# Patient Record
Sex: FEMALE | Race: WHITE | NOT HISPANIC OR LATINO
[De-identification: names, ages, dates, MRNs, and addresses within clinical notes are randomized per-mention and may not be internally consistent; named-entity substitution may affect disease eponyms.]

---

## 2017-03-09 ENCOUNTER — RX ONLY (OUTPATIENT)
Age: 42
Setting detail: RX ONLY
End: 2017-03-09

## 2018-01-11 ENCOUNTER — HOSPITAL ENCOUNTER (OUTPATIENT)
Dept: RADIOLOGY | Facility: MEDICAL CENTER | Age: 43
End: 2018-01-11
Attending: OBSTETRICS & GYNECOLOGY
Payer: COMMERCIAL

## 2018-01-11 DIAGNOSIS — Z12.31 SCREENING MAMMOGRAM, ENCOUNTER FOR: ICD-10-CM

## 2018-01-11 PROCEDURE — 77067 SCR MAMMO BI INCL CAD: CPT

## 2018-01-12 ENCOUNTER — HOSPITAL ENCOUNTER (OUTPATIENT)
Dept: LAB | Facility: MEDICAL CENTER | Age: 43
End: 2018-01-12
Attending: FAMILY MEDICINE
Payer: COMMERCIAL

## 2018-01-12 ENCOUNTER — OFFICE VISIT (OUTPATIENT)
Dept: MEDICAL GROUP | Age: 43
End: 2018-01-12
Payer: COMMERCIAL

## 2018-01-12 VITALS
SYSTOLIC BLOOD PRESSURE: 102 MMHG | HEIGHT: 64 IN | DIASTOLIC BLOOD PRESSURE: 60 MMHG | HEART RATE: 85 BPM | OXYGEN SATURATION: 98 % | BODY MASS INDEX: 20.76 KG/M2 | TEMPERATURE: 98.2 F | WEIGHT: 121.6 LBS

## 2018-01-12 DIAGNOSIS — L65.9 HAIR LOSS: ICD-10-CM

## 2018-01-12 DIAGNOSIS — E78.00 PURE HYPERCHOLESTEROLEMIA: ICD-10-CM

## 2018-01-12 DIAGNOSIS — Z00.00 PREVENTATIVE HEALTH CARE: ICD-10-CM

## 2018-01-12 DIAGNOSIS — K42.0 UMBILICAL HERNIA WITH OBSTRUCTION, WITHOUT GANGRENE: ICD-10-CM

## 2018-01-12 DIAGNOSIS — Z23 NEED FOR VACCINATION: ICD-10-CM

## 2018-01-12 PROBLEM — K42.9 UMBILICAL HERNIA: Status: ACTIVE | Noted: 2018-01-12

## 2018-01-12 LAB
ALBUMIN SERPL BCP-MCNC: 4.4 G/DL (ref 3.2–4.9)
ALBUMIN/GLOB SERPL: 1.4 G/DL
ALP SERPL-CCNC: 34 U/L (ref 30–99)
ALT SERPL-CCNC: 22 U/L (ref 2–50)
ANION GAP SERPL CALC-SCNC: 4 MMOL/L (ref 0–11.9)
AST SERPL-CCNC: 18 U/L (ref 12–45)
BASOPHILS # BLD AUTO: 0.8 % (ref 0–1.8)
BASOPHILS # BLD: 0.04 K/UL (ref 0–0.12)
BILIRUB SERPL-MCNC: 0.8 MG/DL (ref 0.1–1.5)
BUN SERPL-MCNC: 10 MG/DL (ref 8–22)
CALCIUM SERPL-MCNC: 9.7 MG/DL (ref 8.5–10.5)
CHLORIDE SERPL-SCNC: 106 MMOL/L (ref 96–112)
CHOLEST SERPL-MCNC: 211 MG/DL (ref 100–199)
CO2 SERPL-SCNC: 30 MMOL/L (ref 20–33)
CREAT SERPL-MCNC: 0.73 MG/DL (ref 0.5–1.4)
EOSINOPHIL # BLD AUTO: 0.13 K/UL (ref 0–0.51)
EOSINOPHIL NFR BLD: 2.7 % (ref 0–6.9)
ERYTHROCYTE [DISTWIDTH] IN BLOOD BY AUTOMATED COUNT: 46.3 FL (ref 35.9–50)
GLOBULIN SER CALC-MCNC: 3.2 G/DL (ref 1.9–3.5)
GLUCOSE SERPL-MCNC: 80 MG/DL (ref 65–99)
HCT VFR BLD AUTO: 44.2 % (ref 37–47)
HDLC SERPL-MCNC: 74 MG/DL
HGB BLD-MCNC: 14.4 G/DL (ref 12–16)
IMM GRANULOCYTES # BLD AUTO: 0.01 K/UL (ref 0–0.11)
IMM GRANULOCYTES NFR BLD AUTO: 0.2 % (ref 0–0.9)
LDLC SERPL CALC-MCNC: 122 MG/DL
LYMPHOCYTES # BLD AUTO: 1.27 K/UL (ref 1–4.8)
LYMPHOCYTES NFR BLD: 26.4 % (ref 22–41)
MCH RBC QN AUTO: 31.4 PG (ref 27–33)
MCHC RBC AUTO-ENTMCNC: 32.6 G/DL (ref 33.6–35)
MCV RBC AUTO: 96.5 FL (ref 81.4–97.8)
MONOCYTES # BLD AUTO: 0.35 K/UL (ref 0–0.85)
MONOCYTES NFR BLD AUTO: 7.3 % (ref 0–13.4)
NEUTROPHILS # BLD AUTO: 3.01 K/UL (ref 2–7.15)
NEUTROPHILS NFR BLD: 62.6 % (ref 44–72)
NRBC # BLD AUTO: 0 K/UL
NRBC BLD-RTO: 0 /100 WBC
PLATELET # BLD AUTO: 251 K/UL (ref 164–446)
PMV BLD AUTO: 11.3 FL (ref 9–12.9)
POTASSIUM SERPL-SCNC: 3.5 MMOL/L (ref 3.6–5.5)
PROT SERPL-MCNC: 7.6 G/DL (ref 6–8.2)
RBC # BLD AUTO: 4.58 M/UL (ref 4.2–5.4)
SODIUM SERPL-SCNC: 140 MMOL/L (ref 135–145)
TRIGL SERPL-MCNC: 75 MG/DL (ref 0–149)
TSH SERPL DL<=0.005 MIU/L-ACNC: 1.11 UIU/ML (ref 0.38–5.33)
WBC # BLD AUTO: 4.8 K/UL (ref 4.8–10.8)

## 2018-01-12 PROCEDURE — 80053 COMPREHEN METABOLIC PANEL: CPT

## 2018-01-12 PROCEDURE — 80061 LIPID PANEL: CPT

## 2018-01-12 PROCEDURE — 90471 IMMUNIZATION ADMIN: CPT | Performed by: FAMILY MEDICINE

## 2018-01-12 PROCEDURE — 99203 OFFICE O/P NEW LOW 30 MIN: CPT | Mod: 25 | Performed by: FAMILY MEDICINE

## 2018-01-12 PROCEDURE — 90715 TDAP VACCINE 7 YRS/> IM: CPT | Performed by: FAMILY MEDICINE

## 2018-01-12 PROCEDURE — 36415 COLL VENOUS BLD VENIPUNCTURE: CPT

## 2018-01-12 PROCEDURE — 90472 IMMUNIZATION ADMIN EACH ADD: CPT | Performed by: FAMILY MEDICINE

## 2018-01-12 PROCEDURE — 84443 ASSAY THYROID STIM HORMONE: CPT

## 2018-01-12 PROCEDURE — 90686 IIV4 VACC NO PRSV 0.5 ML IM: CPT | Performed by: FAMILY MEDICINE

## 2018-01-12 PROCEDURE — 85025 COMPLETE CBC W/AUTO DIFF WBC: CPT

## 2018-01-12 NOTE — ASSESSMENT & PLAN NOTE
The patient states that she is concerned about having hypothyroidism because she's beginning to lose hair daily in the shower. She can see chunks of hair and after the shower. denies any new fatigue, cold/heat intolerance, weight gain/weight loss, diarrhea/constipation, dry skin, myalgia, depressed mood, palpitations, tremmor, and no goiter.

## 2018-01-12 NOTE — ASSESSMENT & PLAN NOTE
The patient is a 42-year-old  female who presents to clinic to establish care, she has a significant past medical history of umbilical hernia recent history of hair loss she's concerned about having hypothyroidism.   The patient denied any chest pain, no sob, no anand, no  pnd, no orthopnea, no headache, no changes in vision, no numbness or tingling, no nausea, no diarrhea, no abdominal pain, no fevers, no chills, no bright red blood per rectum, no  difficulty urinating, no burning during micturition, no depressed mood, no other concerns.      Has an IUD     No CAD    Father had lymphoma at age 70  at 72    Goes to gym about 3 times per wk, cardio and weights  Dr. Mcpherson has done pap one month     [id number]--00 and 15

## 2018-01-12 NOTE — ASSESSMENT & PLAN NOTE
The patient states that she developed an umbilical hernia after her delivery of her second child, she had this repaired about 5 years ago. Everything else seems to be fine and she continues to do abdominal crunches without any issues. She denies any abdominal pain or change in bowel or urination.

## 2018-01-12 NOTE — PROGRESS NOTES
This medical record contains text that has been entered with the assistance of computer voice recognition and dictation software.  Therefore, it may contain unintended errors in text, spelling, punctuation, or grammar    Chief Complaint   Patient presents with   • Establish Care       Jennifer Leon is a 42 y.o. female here evaluation and management of: Umbilical hernia, establish care, hair loss      HPI:     Preventative health care  The patient is a 42-year-old  female who presents to clinic to [facility], she has a significant past medical history of umbilical hernia recent history of hair loss she's concerned about having hypothyroidism.   The patient denied any chest pain, no sob, no anand, no  pnd, no orthopnea, no headache, no changes in vision, no numbness or tingling, no nausea, no diarrhea, no abdominal pain, no fevers, no chills, no bright red blood per rectum, no  difficulty urinating, no burning during micturition, no depressed mood, no other concerns.      Has an IUD     No CAD    Father had lymphoma at age 70  at 72    Goes to gym about 3 times per wk, cardio and weights  Dr. Mcpherson has done pap one month     C4X3--01 and 15      Umbilical hernia  The patient states that she developed an umbilical hernia after her delivery of her second child, she had this repaired about 5 years ago. Everything else seems to be fine and she continues to do abdominal crunches without any issues. She denies any abdominal pain or change in bowel or urination.    Hair loss  The patient states that she is concerned about having hypothyroidism because she's beginning to lose hair daily in the shower. She can see chunks of hair and after the shower. denies any new fatigue, cold/heat intolerance, weight gain/weight loss, diarrhea/constipation, dry skin, myalgia, depressed mood, palpitations, tremmor, and no goiter.      Current medicines (including changes today)  Current Outpatient Prescriptions  "  Medication Sig Dispense Refill   • Pseudoephedrine-APAP-DM (DAYQUIL PO) Take  by mouth.     • IUD'S IU by Intrauterine route.       No current facility-administered medications for this visit.      She  has a past medical history of Anesthesia; Indigestion; Unspecified urinary incontinence; and Urinary bladder disorder.  She  has a past surgical history that includes other; pr enlarge breast (2004); ventral hernia repair laparoscopic (3/14/2012); and pr enlarge breast with implant.  Social History   Substance Use Topics   • Smoking status: Never Smoker   • Smokeless tobacco: Never Used   • Alcohol use Yes      Comment: 1 per week     Social History     Social History Narrative   • No narrative on file     History reviewed. No pertinent family history.  No family status information on file.         ROS    Please see hpi     All other systems reviewed and are negative     Objective:     Blood pressure 102/60, pulse 85, temperature 36.8 °C (98.2 °F), height 1.626 m (5' 4.02\"), weight 55.2 kg (121 lb 9.6 oz), SpO2 98 %. Body mass index is 20.86 kg/m².  Physical Exam:    Constitutional: Alert, no distress.  Skin: Warm, dry, good turgor, no rashes in visible areas.  Eye: Equal, round and reactive, conjunctiva clear, lids normal.  ENMT: Lips without lesions, good dentition, oropharynx clear.  Neck: Trachea midline, no masses, no thyromegaly. No cervical or supraclavicular lymphadenopathy.  Respiratory: Unlabored respiratory effort, lungs clear to auscultation, no wheezes, no ronchi.  Cardiovascular: Normal S1, S2, no murmur, no edema.  Abdomen: Soft, non-tender, no masses, no hepatosplenomegaly.  Psych: Alert and oriented x3, normal affect and mood.          Assessment and Plan:   The following treatment plan was discussed      1. Hair loss  We will obtain TSH  Also recommend using minoxidil topical 5%  bid  - TSH WITH REFLEX TO FT4; Future    2. Preventative health care  Care has been established  We need baseline labs " to establish a clinical profileWe reviewed USPSTF guidelines    This patient is due for mammogram  States that she had a normal pap last month  Requested Medical records to be sent to us  Denies intimate partner viloence    Due for TDAP and flu        3. Need for vaccination  Given today    - INFLUENZA VACCINE QUAD INJ >3Y(PF)  - TDAP VACCINE =>8YO IM    4. Pure hypercholesterolemia    Need baseline labs    - CBC WITH DIFFERENTIAL; Future  - COMP METABOLIC PANEL; Future  - LIPID PROFILE; Future    5. Umbilical hernia with obstruction, without gangrene  Patient has been stable with current management  We will make no changes for now          HEALTH MAINTENANCE:    Instructed to Follow up in clinic or ER for worsening symptoms, difficulty breathing, lack of expected recovery, or should new symptoms or problems arise.    Followup: Return in about 4 weeks (around 2/9/2018) for EXCISION, Long.       Once again this medical record contains text that has been entered with the assistance of computer voice recognition and dictation software.  Therefore, it may contain unintended errors in text, spelling, punctuation, or grammar

## 2018-01-16 ENCOUNTER — TELEPHONE (OUTPATIENT)
Dept: MEDICAL GROUP | Age: 43
End: 2018-01-16

## 2018-01-16 NOTE — TELEPHONE ENCOUNTER
----- Message from Johnson Rivera M.D. sent at 1/13/2018 12:15 PM PST -----  Please call patient to inform that the potassium level was mildly low, and she shout start eating potassium rich food.    Top 10 Potassium Rich Foods List    •1) Avocado. 1 whole: 1068 mg (30% DV)  •2) Spinach. 1 cup: 839mg (24% DV)  •3) Sweet potato. 1 medium: 952 mg (27% DV)  •4) Coconut Water. 1 cup 600 mg (17% DV)  •5) Kefir or Yogurt. 1 cup: 579 mg (15% DV)  •6) White Beans. ½ cup: 502 mg (15% DV)  •7) Banana. 1 large: 422 mg (12% DV)  •8) [place] squash

## 2018-02-21 ENCOUNTER — OFFICE VISIT (OUTPATIENT)
Dept: MEDICAL GROUP | Age: 43
End: 2018-02-21
Payer: COMMERCIAL

## 2018-02-21 ENCOUNTER — HOSPITAL ENCOUNTER (OUTPATIENT)
Facility: MEDICAL CENTER | Age: 43
End: 2018-02-21
Attending: FAMILY MEDICINE
Payer: COMMERCIAL

## 2018-02-21 VITALS
BODY MASS INDEX: 20.32 KG/M2 | WEIGHT: 119 LBS | OXYGEN SATURATION: 97 % | DIASTOLIC BLOOD PRESSURE: 66 MMHG | HEIGHT: 64 IN | HEART RATE: 61 BPM | SYSTOLIC BLOOD PRESSURE: 108 MMHG | TEMPERATURE: 99.1 F

## 2018-02-21 DIAGNOSIS — D48.9 NEOPLASM OF UNCERTAIN BEHAVIOR: ICD-10-CM

## 2018-02-21 PROCEDURE — 88305 TISSUE EXAM BY PATHOLOGIST: CPT | Mod: 59

## 2018-02-21 PROCEDURE — 11400 EXC TR-EXT B9+MARG 0.5 CM<: CPT | Mod: 59 | Performed by: FAMILY MEDICINE

## 2018-02-21 ASSESSMENT — PATIENT HEALTH QUESTIONNAIRE - PHQ9: CLINICAL INTERPRETATION OF PHQ2 SCORE: 0

## 2018-02-22 NOTE — ASSESSMENT & PLAN NOTE
Patient is a 42-year-old female with slight hair light eyes and sun damaged skin who has noticed 2 new growing moles, they itch slightly sometimes bleeds when scratched.

## 2018-02-22 NOTE — PROGRESS NOTES
"This medical record contains text that has been entered with the assistance of computer voice recognition and dictation software.  Therefore, it may contain unintended errors in text, spelling, punctuation, or grammar    Chief Complaint   Patient presents with   • Procedure     Mole removal       Jennifer Leon is a 42 y.o. female here evaluation and management of: NUB x2       HPI:     Neoplasm of uncertain behavior  Patient is a 42-year-old female with slight hair light eyes and sun damaged skin who has noticed 2 new growing moles, they itch slightly sometimes bleeds when scratched.    Current medicines (including changes today)  Current Outpatient Prescriptions   Medication Sig Dispense Refill   • Pseudoephedrine-APAP-DM (DAYQUIL PO) Take  by mouth.     • IUD'S IU by Intrauterine route.       No current facility-administered medications for this visit.      She  has a past medical history of Anesthesia; Indigestion; Unspecified urinary incontinence; and Urinary bladder disorder.  She  has a past surgical history that includes other; pr enlarge breast (2004); ventral hernia repair laparoscopic (3/14/2012); and pr enlarge breast with implant.  Social History   Substance Use Topics   • Smoking status: Never Smoker   • Smokeless tobacco: Never Used   • Alcohol use Yes      Comment: 1 per week     Social History     Social History Narrative   • No narrative on file     History reviewed. No pertinent family history.  No family status information on file.         ROS    Please see hpi     All other systems reviewed and are negative     Objective:     Blood pressure 108/66, pulse 61, temperature 37.3 °C (99.1 °F), height 1.626 m (5' 4\"), weight 54 kg (119 lb), SpO2 97 %. Body mass index is 20.43 kg/m².  Physical Exam:    Constitutional: Alert, no distress.  Skin: Warm, dry, good turgor, no rashes in visible areas.  Eye: Equal, round and reactive, conjunctiva clear, lids normal.  ENMT: Lips without lesions, good " dentition, oropharynx clear.  Neck: Trachea midline, no masses, no thyromegaly. No cervical or supraclavicular lymphadenopathy.  Respiratory: Unlabored respiratory effort, lungs clear to auscultation, no wheezes, no ronchi.  Cardiovascular: Normal S1, S2, no murmur, no edema.  Abdomen: Soft, non-tender, no masses, no hepatosplenomegaly.  Psych: Alert and oriented x3, normal affect and mood.        Excision--- 5 mm, irregular, assymmetric, hyperpigmented macule located on upper back    Dermoscopy revealed  Pigmented networks,abnormal vessels,     Final Length of Excision--8mm    Similar macule on mid just below above lesion    After informed written consent was obtained, using Betadine for cleansing and 1% Lidocaine w- epinephrine for anesthetic, with sterile technique a 6 mm punch tool was used to obtain and excise  the lesion through dermis (full thickness) . Intent was to remove entire lesion with margins . Hemostasis was obtained by pressure and wound was   Sutured  With 3.0 Ethilon x1  Antibiotic dressing is applied, and wound care instructions provided. Be alert for any signs of cutaneous infection. The specimen is labeled and sent to pathology for evaluation. The procedure was well tolerated without complications.    Same procedure repeated on 2nd macule          Assessment and Plan:   The following treatment plan was discussed      1. Neoplasm of uncertain behavior    Patient tollerrated procedure well  There were no adverse events  Patient was given post procedure precautions     - PATHOLOGY SPECIMEN; Future  - PATHOLOGY SPECIMEN; Future        HEALTH MAINTENANCE:    Instructed to Follow up in clinic or ER for worsening symptoms, difficulty breathing, lack of expected recovery, or should new symptoms or problems arise.    Followup: Return in about 2 weeks (around 3/7/2018) for Suture Removal with MA.       Once again this medical record contains text that has been entered with the assistance of computer  voice recognition and dictation software.  Therefore, it may contain unintended errors in text, spelling, punctuation, or grammar

## 2018-03-02 ENCOUNTER — NON-PROVIDER VISIT (OUTPATIENT)
Dept: MEDICAL GROUP | Age: 43
End: 2018-03-02
Payer: COMMERCIAL

## 2018-03-02 NOTE — PROGRESS NOTES
Jennifer Leon is a 42 y.o. female here for a Non-Provider Visit for Suture Removal.    Sutures were placed by Dr. Ortega on date: 2/21/18  Skin is healed: Yes  Provider notified if skin is not healed, or if there is redness, heat, pain, or drainage from incision: N\A  Sutures removed.   Mastisol and steristips are placed: No    Advised to use emollient (vaseline, aquaphor, etc.) as needed, avoid peroxide and antibiotic ointment to reduce irritation.     Path report has been reviewed by provider.  Path report has been reviewed with patient.

## 2018-03-06 ENCOUNTER — TELEPHONE (OUTPATIENT)
Dept: MEDICAL GROUP | Age: 43
End: 2018-03-06

## 2018-03-06 NOTE — TELEPHONE ENCOUNTER
Phone Number Called: [phone]    Message: lvm for patient to call office back regarding recent biopsy in which results were benign    Left Message for patient to call back: yes

## 2019-01-17 ENCOUNTER — HOSPITAL ENCOUNTER (OUTPATIENT)
Dept: RADIOLOGY | Facility: MEDICAL CENTER | Age: 44
End: 2019-01-17
Attending: OBSTETRICS & GYNECOLOGY
Payer: COMMERCIAL

## 2019-01-17 DIAGNOSIS — Z12.31 VISIT FOR SCREENING MAMMOGRAM: ICD-10-CM

## 2019-01-17 PROCEDURE — 77063 BREAST TOMOSYNTHESIS BI: CPT

## 2021-06-15 ENCOUNTER — APPOINTMENT (OUTPATIENT)
Dept: RADIOLOGY | Facility: MEDICAL CENTER | Age: 46
End: 2021-06-15
Attending: FAMILY MEDICINE
Payer: COMMERCIAL

## 2021-06-15 DIAGNOSIS — Z12.31 VISIT FOR SCREENING MAMMOGRAM: ICD-10-CM

## 2021-10-26 DIAGNOSIS — Z78.0 MENOPAUSE: ICD-10-CM

## 2021-10-26 DIAGNOSIS — Z00.00 ANNUAL PHYSICAL EXAM: ICD-10-CM

## 2022-06-14 ENCOUNTER — HOSPITAL ENCOUNTER (OUTPATIENT)
Dept: RADIOLOGY | Facility: MEDICAL CENTER | Age: 47
End: 2022-06-14
Attending: OBSTETRICS & GYNECOLOGY
Payer: COMMERCIAL

## 2022-06-14 DIAGNOSIS — Z12.31 VISIT FOR SCREENING MAMMOGRAM: ICD-10-CM

## 2022-06-14 PROCEDURE — 77063 BREAST TOMOSYNTHESIS BI: CPT

## 2022-08-23 ENCOUNTER — OFFICE VISIT (OUTPATIENT)
Dept: URGENT CARE | Facility: CLINIC | Age: 47
End: 2022-08-23
Payer: COMMERCIAL

## 2022-08-23 VITALS
RESPIRATION RATE: 16 BRPM | HEART RATE: 65 BPM | OXYGEN SATURATION: 99 % | HEIGHT: 64 IN | TEMPERATURE: 98.2 F | SYSTOLIC BLOOD PRESSURE: 116 MMHG | DIASTOLIC BLOOD PRESSURE: 76 MMHG | WEIGHT: 112 LBS | BODY MASS INDEX: 19.12 KG/M2

## 2022-08-23 DIAGNOSIS — R21 RASH: ICD-10-CM

## 2022-08-23 DIAGNOSIS — B02.9 HERPES ZOSTER WITHOUT COMPLICATION: ICD-10-CM

## 2022-08-23 PROCEDURE — 99203 OFFICE O/P NEW LOW 30 MIN: CPT

## 2022-08-23 RX ORDER — VALACYCLOVIR HYDROCHLORIDE 1 G/1
1000 TABLET, FILM COATED ORAL 3 TIMES DAILY
Qty: 21 TABLET | Refills: 0 | Status: SHIPPED | OUTPATIENT
Start: 2022-08-23 | End: 2022-08-30

## 2022-08-23 ASSESSMENT — ENCOUNTER SYMPTOMS
FEVER: 0
RHINORRHEA: 0
FATIGUE: 0
ANOREXIA: 0
CHILLS: 0
DIARRHEA: 0
SHORTNESS OF BREATH: 0

## 2022-08-23 NOTE — PROGRESS NOTES
Subjective     Jennifer Leon is a 46 y.o. female who presents with Rash (Near sternum/chest started on Friday, painful to touch,blisters, pain into left axilla/back of arm )    Patient presents with symptoms that started 3 days ago.  She endorses rash on the left side of her sternum.  She reports rash to be blisterlike, initially itchy but progressively became painful.  She notes that this became more erythematous and bigger.  She also notices to be tender to touch.  She has been using/applying Neosporin to the area.      Rash  This is a new problem. The current episode started in the past 7 days. The rash is characterized by blistering and pain. She was exposed to nothing. Pertinent negatives include no anorexia, diarrhea, fatigue, fever, rhinorrhea or shortness of breath. Past treatments include antibiotic cream. The treatment provided no relief. There is no history of allergies, eczema or varicella.       Patient's current problem list, medications, and past medical/surgical history were reviewed in Epic.    PMH:  has a past medical history of Anesthesia, Indigestion, Unspecified urinary incontinence, and Urinary bladder disorder.  MEDS:   Current Outpatient Medications:     valacyclovir (VALTREX) 1 GM Tab, Take 1 Tablet by mouth 3 times a day for 7 days., Disp: 21 Tablet, Rfl: 0    IUD'S IU, by Intrauterine route., Disp: , Rfl:   ALLERGIES: No Known Allergies  SURGHX:   Past Surgical History:   Procedure Laterality Date    VENTRAL HERNIA REPAIR LAPAROSCOPIC  3/14/2012    Performed by OPAL ELY at [facility]    OH ENLARGE BREAST  2004    OTHER      OH BREAST AUGMENTATION WITH IMPLANT       SOCHX:  reports that she has never smoked. She has never used smokeless tobacco. She reports current alcohol use. She reports that she does not use drugs.  FH: Reviewed with patient, not pertinent to this visit.     Review of Systems   Constitutional:  Negative for chills, fatigue and fever.   HENT:   "Negative for rhinorrhea.    Respiratory:  Negative for shortness of breath.    Gastrointestinal:  Negative for anorexia and diarrhea.   Skin:  Positive for rash.   All other systems reviewed and are negative.           Objective     /76   Pulse 65   Temp 36.8 °C (98.2 °F) (Temporal)   Resp 16   Ht 1.626 m (5' 4\")   Wt 50.8 kg (112 lb)   SpO2 99%   Breastfeeding No   BMI 19.22 kg/m²      Physical Exam  Constitutional:       Appearance: Normal appearance.   HENT:      Head: Normocephalic.      Nose: Nose normal.   Eyes:      Extraocular Movements: Extraocular movements intact.   Cardiovascular:      Rate and Rhythm: Normal rate and regular rhythm.      Pulses: Normal pulses.      Heart sounds: Normal heart sounds.   Pulmonary:      Effort: Pulmonary effort is normal.      Breath sounds: Normal breath sounds.   Musculoskeletal:         General: Normal range of motion.      Cervical back: Normal range of motion.   Skin:     General: Skin is warm and dry.      Findings: Rash present. Rash is vesicular.             Comments: Vesicular rash on erythematous base noted on the left side of her chest.  This is tender to touch.  No signs of infection noted   Neurological:      General: No focal deficit present.      Mental Status: She is alert.   Psychiatric:         Mood and Affect: Mood normal.         Behavior: Behavior normal.         Judgment: Judgment normal.                           Assessment & Plan        1. Herpes zoster without complication    - valacyclovir (VALTREX) 1 GM Tab; Take 1 Tablet by mouth 3 times a day for 7 days.  Dispense: 21 Tablet; Refill: 0    2. Rash    - valacyclovir (VALTREX) 1 GM Tab; Take 1 Tablet by mouth 3 times a day for 7 days.  Dispense: 21 Tablet; Refill: 0    Patient's presentation is consistent with herpes zoster/shingles.  She is prescribed Valtrex 3 times daily for a week.  May take ibuprofen as needed for pain management. Discussed treatment plan with patient, she is " agreeable and verbalized understanding.  Educated patient on signs and symptoms watch out for, when to return to the clinic or go to the ER.    Electronically Signed by THO Clark

## 2022-08-26 ENCOUNTER — OFFICE VISIT (OUTPATIENT)
Dept: MEDICAL GROUP | Age: 47
End: 2022-08-26
Payer: COMMERCIAL

## 2022-08-26 VITALS
WEIGHT: 113.4 LBS | DIASTOLIC BLOOD PRESSURE: 62 MMHG | OXYGEN SATURATION: 98 % | TEMPERATURE: 98.9 F | RESPIRATION RATE: 16 BRPM | SYSTOLIC BLOOD PRESSURE: 112 MMHG | HEART RATE: 74 BPM | HEIGHT: 64 IN | BODY MASS INDEX: 19.36 KG/M2

## 2022-08-26 DIAGNOSIS — B02.9 HERPES ZOSTER WITHOUT COMPLICATION: ICD-10-CM

## 2022-08-26 PROCEDURE — 99214 OFFICE O/P EST MOD 30 MIN: CPT | Performed by: FAMILY MEDICINE

## 2022-08-26 RX ORDER — TRIAMCINOLONE ACETONIDE 1 MG/G
1 CREAM TOPICAL 2 TIMES DAILY
Qty: 15 G | Refills: 0 | Status: SHIPPED | OUTPATIENT
Start: 2022-08-26 | End: 2023-02-15

## 2022-08-26 RX ORDER — PREDNISONE 20 MG/1
TABLET ORAL
Qty: 12 TABLET | Refills: 0 | Status: SHIPPED | OUTPATIENT
Start: 2022-08-26 | End: 2023-02-15

## 2022-08-26 ASSESSMENT — PATIENT HEALTH QUESTIONNAIRE - PHQ9: CLINICAL INTERPRETATION OF PHQ2 SCORE: 0

## 2022-08-26 NOTE — PROGRESS NOTES
This medical record contains text that has been entered with the assistance of computer voice recognition and dictation software.  Therefore, it may contain unintended errors in text, spelling, punctuation, or grammar      Chief Complaint   Patient presents with    Rash     Blistered, painful    Herpes Zoster         Jennifer Leon is a 46 y.o. female here evaluation and management of: Rash      HPI:           1. Herpes zoster without complication  NEW UNDIAGNOSED PROBLEM     is a very pleasant 46-year-old female who presents to clinic with a chief plaint of having a painful itchy rash midsternum under the bra, she also states that she had itchiness and some pain in the left shoulder left elbow and left thorax.  This started Friday last week.  Patient states she is never developed chickenpox in the past.  She was seen in urgent care on August 23 and given Valtrex 1 g 3 times daily for 7 days.  Overall she states that the rash is still there the pain is decreasing the itchiness is still there.    Current medicines (including changes today)  Current Outpatient Medications   Medication Sig Dispense Refill    predniSONE (DELTASONE) 20 MG Tab Take 3 tabs po for 2 days then 2 tabs for 2 days then 1 for 2 days 12 Tablet 0    triamcinolone acetonide (KENALOG) 0.1 % Cream Apply 1 Applicator topically 2 times a day. 15 g 0    valacyclovir (VALTREX) 1 GM Tab Take 1 Tablet by mouth 3 times a day for 7 days. 21 Tablet 0     No current facility-administered medications for this visit.     She  has a past medical history of Anesthesia, Indigestion, Unspecified urinary incontinence, and Urinary bladder disorder.  She  has a past surgical history that includes other; pr enlarge breast (2004); ventral hernia repair laparoscopic (3/14/2012); and pr breast augmentation with implant.  Social History     Tobacco Use    Smoking status: Never    Smokeless tobacco: Never   Substance Use Topics    Alcohol use: Yes      "Comment: 1 per week    Drug use: No     Social History     Social History Narrative    Not on file     History reviewed. No pertinent family history.  No family status information on file.         ROS    The pertinent  ROS findings can be seen in the HPI above.     All other systems reviewed and are negative     Objective:     /62 (BP Location: Right arm, Patient Position: Sitting, BP Cuff Size: Adult long)   Pulse 74   Temp 37.2 °C (98.9 °F) (Temporal)   Resp 16   Ht 1.626 m (5' 4\")   Wt 51.4 kg (113 lb 6.4 oz)   SpO2 98%  Body mass index is 19.47 kg/m².      Physical Exam:    Constitutional: Alert, no distress.  Skin: Group of vesicles, circular pattern right under the sternum, surrounding erythema  Eye: Equal, round and reactive, conjunctiva clear, lids normal.  ENMT: Lips without lesions, good dentition, oropharynx clear.  Neck: Trachea midline, no masses, no thyromegaly. No cervical or supraclavicular lymphadenopathy.  Respiratory: Unlabored respiratory effort, lungs clear to auscultation, no wheezes, no ronchi.  Cardiovascular: Normal S1, S2, no murmur, no edema  Abdomen: Soft, non-tender, no masses, no hepatosplenomegaly.        Assessment and Plan:   The following treatment plan was discussed      1. Herpes zoster without complication      We discussed that the goals of antiviral therapy is to    1.  Lessen the severity and duration of pain associated with acute neuritis  2.  Promote more rapid healing of the lesions  3.  Prevent new lesions  4.  Decrease viral shedding which will reduce the risk of transmission  5. prevent postherpetic neuralgia    This  infection is localized,  Patient was counseled about the risk of transmission to others   In addition, until the rash has crusted, patients should be advised to:   Keep the rash covered, and to wash their hands often to prevent the spread of virus to others.   Avoid contact with pregnant women who have never had chickenpox or the  Varicella " vaccine   - predniSONE (DELTASONE) 20 MG Tab; Take 3 tabs po for 2 days then 2 tabs for 2 days then 1 for 2 days  Dispense: 12 Tablet; Refill: 0  - triamcinolone acetonide (KENALOG) 0.1 % Cream; Apply 1 Applicator topically 2 times a day.  Dispense: 15 g; Refill: 0            Instructed to Follow up in clinic or ER for worsening symptoms, difficulty breathing, lack of expected recovery, or should new symptoms or problems arise.    Followup: Return in about 3 months (around 11/26/2022) for Reevaluation, labs.

## 2022-10-18 ENCOUNTER — HOSPITAL ENCOUNTER (OUTPATIENT)
Dept: LAB | Facility: MEDICAL CENTER | Age: 47
End: 2022-10-18
Attending: FAMILY MEDICINE
Payer: COMMERCIAL

## 2022-10-18 DIAGNOSIS — Z78.0 MENOPAUSE: ICD-10-CM

## 2022-10-18 DIAGNOSIS — Z00.00 ANNUAL PHYSICAL EXAM: ICD-10-CM

## 2022-10-18 LAB
25(OH)D3 SERPL-MCNC: 23 NG/ML (ref 30–100)
ALBUMIN SERPL BCP-MCNC: 4.6 G/DL (ref 3.2–4.9)
ALBUMIN/GLOB SERPL: 1.9 G/DL
ALP SERPL-CCNC: 41 U/L (ref 30–99)
ALT SERPL-CCNC: 29 U/L (ref 2–50)
ANION GAP SERPL CALC-SCNC: 11 MMOL/L (ref 7–16)
AST SERPL-CCNC: 29 U/L (ref 12–45)
BILIRUB SERPL-MCNC: 0.5 MG/DL (ref 0.1–1.5)
BUN SERPL-MCNC: 14 MG/DL (ref 8–22)
CALCIUM SERPL-MCNC: 9.4 MG/DL (ref 8.5–10.5)
CHLORIDE SERPL-SCNC: 107 MMOL/L (ref 96–112)
CHOLEST SERPL-MCNC: 209 MG/DL (ref 100–199)
CO2 SERPL-SCNC: 25 MMOL/L (ref 20–33)
CREAT SERPL-MCNC: 0.79 MG/DL (ref 0.5–1.4)
ESTRADIOL SERPL-MCNC: 68.8 PG/ML
FASTING STATUS PATIENT QL REPORTED: NORMAL
FSH SERPL-ACNC: 35.8 MIU/ML
GFR SERPLBLD CREATININE-BSD FMLA CKD-EPI: 92 ML/MIN/1.73 M 2
GLOBULIN SER CALC-MCNC: 2.4 G/DL (ref 1.9–3.5)
GLUCOSE SERPL-MCNC: 100 MG/DL (ref 65–99)
HDLC SERPL-MCNC: 71 MG/DL
LDLC SERPL CALC-MCNC: 124 MG/DL
LH SERPL-ACNC: 33.5 IU/L
POTASSIUM SERPL-SCNC: 4.5 MMOL/L (ref 3.6–5.5)
PROT SERPL-MCNC: 7 G/DL (ref 6–8.2)
SODIUM SERPL-SCNC: 143 MMOL/L (ref 135–145)
T3FREE SERPL-MCNC: 2.45 PG/ML (ref 2–4.4)
T4 FREE SERPL-MCNC: 1.24 NG/DL (ref 0.93–1.7)
TRIGL SERPL-MCNC: 69 MG/DL (ref 0–149)
TSH SERPL DL<=0.005 MIU/L-ACNC: 1.46 UIU/ML (ref 0.38–5.33)

## 2022-10-18 PROCEDURE — 84443 ASSAY THYROID STIM HORMONE: CPT

## 2022-10-18 PROCEDURE — 84403 ASSAY OF TOTAL TESTOSTERONE: CPT

## 2022-10-18 PROCEDURE — 80053 COMPREHEN METABOLIC PANEL: CPT

## 2022-10-18 PROCEDURE — 84402 ASSAY OF FREE TESTOSTERONE: CPT

## 2022-10-18 PROCEDURE — 83001 ASSAY OF GONADOTROPIN (FSH): CPT

## 2022-10-18 PROCEDURE — 84270 ASSAY OF SEX HORMONE GLOBUL: CPT

## 2022-10-18 PROCEDURE — 82306 VITAMIN D 25 HYDROXY: CPT

## 2022-10-18 PROCEDURE — 80061 LIPID PANEL: CPT

## 2022-10-18 PROCEDURE — 82670 ASSAY OF TOTAL ESTRADIOL: CPT

## 2022-10-18 PROCEDURE — 83002 ASSAY OF GONADOTROPIN (LH): CPT

## 2022-10-18 PROCEDURE — 36415 COLL VENOUS BLD VENIPUNCTURE: CPT

## 2022-10-18 PROCEDURE — 84481 FREE ASSAY (FT-3): CPT

## 2022-10-18 PROCEDURE — 84439 ASSAY OF FREE THYROXINE: CPT

## 2022-10-24 LAB
SHBG SERPL-SCNC: 33 NMOL/L (ref 25–122)
TESTOST FREE SERPL-MCNC: 3 PG/ML (ref 1.1–5.8)
TESTOST SERPL-MCNC: 18 NG/DL (ref 9–55)

## 2022-12-13 ENCOUNTER — TELEPHONE (OUTPATIENT)
Dept: MEDICAL GROUP | Age: 47
End: 2022-12-13
Payer: COMMERCIAL

## 2022-12-13 DIAGNOSIS — Z23 NEED FOR VACCINATION: ICD-10-CM

## 2022-12-13 NOTE — TELEPHONE ENCOUNTER
VOICEMAIL  1. Caller Name: Jennifer Leon                        Call Back Number: [phone] (home)       2. Message: Patient called requesting Quantiferin TB order to [facility] Please advise.    3. Patient approves office to leave a detailed voicemail/MyChart message: yes

## 2023-02-15 ENCOUNTER — OFFICE VISIT (OUTPATIENT)
Dept: MEDICAL GROUP | Age: 48
End: 2023-02-15
Payer: COMMERCIAL

## 2023-02-15 VITALS
HEART RATE: 71 BPM | DIASTOLIC BLOOD PRESSURE: 55 MMHG | HEIGHT: 64 IN | WEIGHT: 117.2 LBS | RESPIRATION RATE: 16 BRPM | TEMPERATURE: 98 F | SYSTOLIC BLOOD PRESSURE: 120 MMHG | BODY MASS INDEX: 20.01 KG/M2 | OXYGEN SATURATION: 98 %

## 2023-02-15 DIAGNOSIS — E78.00 PURE HYPERCHOLESTEROLEMIA: ICD-10-CM

## 2023-02-15 DIAGNOSIS — Z12.11 SCREENING FOR COLON CANCER: ICD-10-CM

## 2023-02-15 DIAGNOSIS — Z00.00 ANNUAL PHYSICAL EXAM: ICD-10-CM

## 2023-02-15 DIAGNOSIS — Z11.59 NEED FOR HEPATITIS C SCREENING TEST: ICD-10-CM

## 2023-02-15 DIAGNOSIS — G44.201 INTRACTABLE TENSION-TYPE HEADACHE, UNSPECIFIED CHRONICITY PATTERN: ICD-10-CM

## 2023-02-15 PROBLEM — G44.209 TENSION TYPE HEADACHE: Status: ACTIVE | Noted: 2023-02-15

## 2023-02-15 PROCEDURE — 99214 OFFICE O/P EST MOD 30 MIN: CPT | Performed by: FAMILY MEDICINE

## 2023-02-15 RX ORDER — AMITRIPTYLINE HYDROCHLORIDE 10 MG/1
10 TABLET, FILM COATED ORAL NIGHTLY
Qty: 90 TABLET | Refills: 0 | Status: SHIPPED | OUTPATIENT
Start: 2023-02-15 | End: 2023-05-16

## 2023-02-15 NOTE — PROGRESS NOTES
This medical record contains text that has been entered with the assistance of computer voice recognition and dictation software.  Therefore, it may contain unintended errors in text, spelling, punctuation, or grammar      Chief Complaint   Patient presents with    Headache     PT states she has been having headaches for about a year.Left side of head and behind the eye./ Go over blood work.         Jennifer Leon is a 47 y.o. female here evaluation and management of: headaches      HPI:           1. Intractable tension-type headache, unspecified chronicity pattern  NEW UNDIAGNOSED PROBLEM     is a very pleasant 47-year-old female who presents to clinic with chief plaint of having a headache on the left side of her face behind the eye and the occipital region.  Its been going on for over a year comes and goes it can last for 20 minutes over a week.  He has been taking over-the-counter anti-inflammatories as well as Tylenol with little benefit.  She has been having these 2-3 times a week sometimes.  There is significant stress in her life she is very worried about her son who has been getting into some mischief.  She denies any early morning vomiting, no changes in vision, no double vision, no shortness of breath no nausea or vomiting.    2. Screening for colon cancer  Overdue for colon cancer screening    3. Annual labs  Due for annual labs        Current medicines (including changes today)  Current Outpatient Medications   Medication Sig Dispense Refill    amitriptyline (ELAVIL) 10 MG Tab Take 1 Tablet by mouth every evening. 90 Tablet 0     No current facility-administered medications for this visit.     She  has a past medical history of Anesthesia, Indigestion, Unspecified urinary incontinence, and Urinary bladder disorder.  She  has a past surgical history that includes other; pr enlarge breast (2004); ventral hernia repair laparoscopic (3/14/2012); and pr breast augmentation with  "implant.  Social History     Tobacco Use    Smoking status: Never    Smokeless tobacco: Never   Substance Use Topics    Alcohol use: Yes     Comment: 1 per week    Drug use: No     Social History     Social History Narrative    Not on file     History reviewed. No pertinent family history.  No family status information on file.         ROS    The pertinent  ROS findings can be seen in the HPI above.     All other systems reviewed and are negative     Objective:     /55 (BP Location: Right arm, Patient Position: Sitting, BP Cuff Size: Adult)   Pulse 71   Temp 36.7 °C (98 °F) (Temporal)   Resp 16   Ht 1.626 m (5' 4\")   Wt 53.2 kg (117 lb 3.2 oz)   SpO2 98%  Body mass index is 20.12 kg/m².      Physical Exam:    Constitutional: Alert, no distress.  Skin: No suspicious lesions  Eye: Equal, round and reactive, conjunctiva clear, lids normal.  ENMT: Lips without lesions, good dentition, oropharynx clear.  Neck: Trachea midline, no masses, no thyromegaly. No cervical or supraclavicular lymphadenopathy.  Respiratory: Unlabored respiratory effort, lungs clear to auscultation, no wheezes, no ronchi.  Cardiovascular: Normal S1, S2, no murmur, no edema  Abdomen: Soft, non-tender, no masses, no hepatosplenomegaly.  Neuro:  CN II-XII checked and intact, DTRs 2+ throughout, intact sensation to light touch, vibration, normal gait, No focal deficits, Romberg Normal, Normal tandem gait, normal heel to shin, normal finger to nose.                Assessment and Plan:   The following treatment plan was discussed    All recent labs and provider notes reviewed    1. Intractable tension-type headache, unspecified chronicity pattern    We will begin a prophylactic medication for headaches.  We are avoiding beta-blocker because she already complains of fatigue.  CT scan of the head to evaluate for space-occupying lesion.    - amitriptyline (ELAVIL) 10 MG Tab; Take 1 Tablet by mouth every evening.  Dispense: 90 Tablet; Refill: " 0  - CT-HEAD W/O; Future    2. Screening for colon cancer  - Referral to Gastroenterology    3. Annual labs  - Comp Metabolic Panel; Future  - CBC WITH DIFFERENTIAL; Future  - Lipid Profile; Future  - TSH+FREE T4  - T3 FREE; Future    4. Need for hepatitis C screening test  - HEP C VIRUS ANTIBODY; Future    5. Pure hypercholesterolemia  - Comp Metabolic Panel; Future  - CBC WITH DIFFERENTIAL; Future  - Lipid Profile; Future  - TSH+FREE T4  - T3 FREE; Future  - HEP C VIRUS ANTIBODY; Future             Instructed to Follow up in clinic or ER for worsening symptoms, difficulty breathing, lack of expected recovery, or should new symptoms or problems arise.    Followup: Return in about 2 months (around 4/15/2023) for Reevaluation, labs.

## 2023-02-16 LAB
ALBUMIN SERPL-MCNC: 4.8 G/DL (ref 3.8–4.8)
ALBUMIN/GLOB SERPL: 1.9 {RATIO} (ref 1.2–2.2)
ALP SERPL-CCNC: 41 IU/L (ref 44–121)
ALT SERPL-CCNC: 23 IU/L (ref 0–32)
AST SERPL-CCNC: 21 IU/L (ref 0–40)
BASOPHILS # BLD AUTO: 0 X10E3/UL (ref 0–0.2)
BASOPHILS NFR BLD AUTO: 1 %
BILIRUB SERPL-MCNC: 0.5 MG/DL (ref 0–1.2)
BUN SERPL-MCNC: 11 MG/DL (ref 6–24)
BUN/CREAT SERPL: 15 (ref 9–23)
CALCIUM SERPL-MCNC: 9.9 MG/DL (ref 8.7–10.2)
CHLORIDE SERPL-SCNC: 103 MMOL/L (ref 96–106)
CHOLEST SERPL-MCNC: 204 MG/DL (ref 100–199)
CO2 SERPL-SCNC: 25 MMOL/L (ref 20–29)
CREAT SERPL-MCNC: 0.74 MG/DL (ref 0.57–1)
EGFRCR SERPLBLD CKD-EPI 2021: 100 ML/MIN/1.73
EOSINOPHIL # BLD AUTO: 0.2 X10E3/UL (ref 0–0.4)
EOSINOPHIL NFR BLD AUTO: 4 %
ERYTHROCYTE [DISTWIDTH] IN BLOOD BY AUTOMATED COUNT: 12 % (ref 11.7–15.4)
GLOBULIN SER CALC-MCNC: 2.5 G/DL (ref 1.5–4.5)
GLUCOSE SERPL-MCNC: 93 MG/DL (ref 70–99)
HCT VFR BLD AUTO: 44.8 % (ref 34–46.6)
HCV IGG SERPL QL IA: NON REACTIVE
HDLC SERPL-MCNC: 72 MG/DL
HGB BLD-MCNC: 14.9 G/DL (ref 11.1–15.9)
IMM GRANULOCYTES # BLD AUTO: 0 X10E3/UL (ref 0–0.1)
IMM GRANULOCYTES NFR BLD AUTO: 0 %
IMMATURE CELLS  115398: NORMAL
LABORATORY COMMENT REPORT: ABNORMAL
LDLC SERPL CALC-MCNC: 114 MG/DL (ref 0–99)
LYMPHOCYTES # BLD AUTO: 1.7 X10E3/UL (ref 0.7–3.1)
LYMPHOCYTES NFR BLD AUTO: 32 %
MCH RBC QN AUTO: 31.5 PG (ref 26.6–33)
MCHC RBC AUTO-ENTMCNC: 33.3 G/DL (ref 31.5–35.7)
MCV RBC AUTO: 95 FL (ref 79–97)
MONOCYTES # BLD AUTO: 0.4 X10E3/UL (ref 0.1–0.9)
MONOCYTES NFR BLD AUTO: 8 %
MORPHOLOGY BLD-IMP: NORMAL
NEUTROPHILS # BLD AUTO: 2.9 X10E3/UL (ref 1.4–7)
NEUTROPHILS NFR BLD AUTO: 55 %
NRBC BLD AUTO-RTO: NORMAL %
PLATELET # BLD AUTO: 266 X10E3/UL (ref 150–450)
POTASSIUM SERPL-SCNC: 4.3 MMOL/L (ref 3.5–5.2)
PROT SERPL-MCNC: 7.3 G/DL (ref 6–8.5)
RBC # BLD AUTO: 4.73 X10E6/UL (ref 3.77–5.28)
SODIUM SERPL-SCNC: 142 MMOL/L (ref 134–144)
T3FREE SERPL-MCNC: 3.3 PG/ML (ref 2–4.4)
T4 FREE SERPL-MCNC: 1.37 NG/DL (ref 0.82–1.77)
TRIGL SERPL-MCNC: 101 MG/DL (ref 0–149)
TSH SERPL DL<=0.005 MIU/L-ACNC: 2.08 UIU/ML (ref 0.45–4.5)
VLDLC SERPL CALC-MCNC: 18 MG/DL (ref 5–40)
WBC # BLD AUTO: 5.3 X10E3/UL (ref 3.4–10.8)

## 2023-03-07 ENCOUNTER — HOSPITAL ENCOUNTER (OUTPATIENT)
Dept: RADIOLOGY | Facility: MEDICAL CENTER | Age: 48
End: 2023-03-07
Attending: FAMILY MEDICINE
Payer: COMMERCIAL

## 2023-03-07 DIAGNOSIS — G44.201 INTRACTABLE TENSION-TYPE HEADACHE, UNSPECIFIED CHRONICITY PATTERN: ICD-10-CM

## 2023-03-07 PROCEDURE — 70450 CT HEAD/BRAIN W/O DYE: CPT

## 2023-05-16 DIAGNOSIS — G44.201 INTRACTABLE TENSION-TYPE HEADACHE, UNSPECIFIED CHRONICITY PATTERN: ICD-10-CM

## 2023-05-16 RX ORDER — AMITRIPTYLINE HYDROCHLORIDE 10 MG/1
TABLET, FILM COATED ORAL
Qty: 90 TABLET | Refills: 0 | Status: SHIPPED | OUTPATIENT
Start: 2023-05-16

## 2023-06-15 ENCOUNTER — HOSPITAL ENCOUNTER (OUTPATIENT)
Dept: RADIOLOGY | Facility: MEDICAL CENTER | Age: 48
End: 2023-06-15
Attending: OBSTETRICS & GYNECOLOGY
Payer: COMMERCIAL

## 2023-06-15 DIAGNOSIS — Z12.31 VISIT FOR SCREENING MAMMOGRAM: ICD-10-CM

## 2023-06-15 PROCEDURE — 77063 BREAST TOMOSYNTHESIS BI: CPT

## 2025-03-19 ENCOUNTER — TELEPHONE (OUTPATIENT)
Dept: MEDICAL GROUP | Age: 50
End: 2025-03-19
Payer: COMMERCIAL

## 2025-03-19 NOTE — TELEPHONE ENCOUNTER
Phone Number Called: [phone]    Call outcome: Spoke to patient regarding message below.    Message: CALLED PATIENT TO INFORM THAT TO COMPLETE HER REQUEST FOR A REFERRAL FOR HER BUNION ON HER TOE WE WOULD NEED TO HAVE HER BE SEEN AS A FOLLOW UP SINCE SHE HAS NOT SEEN DR. ESQUEDA SINCE 2023 AND FOR REFERRAL

## 2025-03-21 ENCOUNTER — OFFICE VISIT (OUTPATIENT)
Dept: MEDICAL GROUP | Age: 50
End: 2025-03-21
Payer: COMMERCIAL

## 2025-03-21 VITALS
SYSTOLIC BLOOD PRESSURE: 112 MMHG | OXYGEN SATURATION: 99 % | HEIGHT: 63 IN | HEART RATE: 68 BPM | BODY MASS INDEX: 23.57 KG/M2 | WEIGHT: 133 LBS | DIASTOLIC BLOOD PRESSURE: 72 MMHG | TEMPERATURE: 97.3 F

## 2025-03-21 DIAGNOSIS — E78.2 MIXED HYPERLIPIDEMIA: ICD-10-CM

## 2025-03-21 DIAGNOSIS — M21.612 BUNION OF GREAT TOE OF LEFT FOOT: ICD-10-CM

## 2025-03-21 DIAGNOSIS — Z00.00 ANNUAL PHYSICAL EXAM: ICD-10-CM

## 2025-03-21 DIAGNOSIS — M21.611 BUNION OF GREAT TOE OF RIGHT FOOT: ICD-10-CM

## 2025-03-21 DIAGNOSIS — Z12.31 ENCOUNTER FOR SCREENING MAMMOGRAM FOR BREAST CANCER: ICD-10-CM

## 2025-03-21 DIAGNOSIS — E55.9 VITAMIN D DEFICIENCY: ICD-10-CM

## 2025-03-21 PROCEDURE — 99214 OFFICE O/P EST MOD 30 MIN: CPT | Performed by: FAMILY MEDICINE

## 2025-03-21 PROCEDURE — 3074F SYST BP LT 130 MM HG: CPT | Performed by: FAMILY MEDICINE

## 2025-03-21 PROCEDURE — 3078F DIAST BP <80 MM HG: CPT | Performed by: FAMILY MEDICINE

## 2025-03-21 RX ORDER — ESTRADIOL 0.07 MG/D
FILM, EXTENDED RELEASE TRANSDERMAL
COMMUNITY

## 2025-03-21 ASSESSMENT — PATIENT HEALTH QUESTIONNAIRE - PHQ9: CLINICAL INTERPRETATION OF PHQ2 SCORE: 0

## 2025-03-21 NOTE — PROGRESS NOTES
This medical record contains text that has been entered with the assistance of computer voice recognition and dictation software.  Therefore, it may contain unintended errors in text, spelling, punctuation, or grammar      Verbal consent was acquired by the patient to use [employer] ambient listening note generation during this visit Yes       Chief Complaint   Patient presents with    Follow-Up     REFERRAL          Jennifer Leon is a 49 y.o. female here evaluation and management of: referral      HPI:           1. Annual physical exam      2. Bunion of great toe of left foot    History of Present Illness  The patient is a 49-year-old female who presents for an annual exam and bunion.    She has completed her Pap smear but has yet to undergo her mammogram. She reports no knee or hip pain but does mention a creaking sound in her knees when ascending or descending stairs.    She is seeking a referral for bunion removal due to increasing discomfort. Despite wearing wide-shaped shoes, she experiences pain during her frequent running activities, which include marathons.    She has been diagnosed with bladder prolapse and is scheduled for a mesh sling procedure under her bladder. She recently consulted with Dr. Goodson, a urologist, who confirmed the need for the procedure. She reports urinary incontinence even with minimal exertion such as sneezing.    FAMILY HISTORY  Her mother had dementia and passed away at the age of 84.        Current medicines (including changes today)  Current Outpatient Medications   Medication Sig Dispense Refill    estradiol (NEVILLE) 0.075 MG/24HR PATCH BIWEEKLY       amitriptyline (ELAVIL) 10 MG Tab TAKE 1 TABLET BY MOUTH EVERY DAY IN THE EVENING 90 Tablet 0     No current facility-administered medications for this visit.     She  has a past medical history of Anesthesia, Indigestion, Unspecified urinary incontinence, and Urinary bladder disorder.  She  has a past surgical history  "that includes other; pr enlarge breast (2004); ventral hernia repair laparoscopic (3/14/2012); and pr breast augmentation with implant.  Social History     Tobacco Use    Smoking status: Never    Smokeless tobacco: Never   Substance Use Topics    Alcohol use: Yes     Comment: 1 per week    Drug use: No     Social History     Social History Narrative    Not on file     History reviewed. No pertinent family history.  No family status information on file.         ROS    The pertinent  ROS findings can be seen in the HPI above.     All other systems reviewed and are negative     Objective:     /72 (BP Location: Right arm, Patient Position: Sitting, BP Cuff Size: Adult)   Pulse 68   Temp 36.3 °C (97.3 °F) (Temporal)   Ht 1.6 m (5' 3\")   Wt 60.3 kg (133 lb)   SpO2 99%  Body mass index is 23.56 kg/m².      Physical Exam:    Constitutional: Alert, no distress.  Skin: No suspicious lesions  Eye: Equal, round and reactive, conjunctiva clear, lids normal.  ENMT: Lips without lesions, good dentition, oropharynx clear.  Neck: Trachea midline, no masses, no thyromegaly. No cervical or supraclavicular lymphadenopathy.  Respiratory: Unlabored respiratory effort, lungs clear to auscultation, no wheezes, no ronchi.  Cardiovascular: Normal S1, S2, no murmur, no edema  Abdomen: Soft, non-tender, no masses, no hepatosplenomegaly.        Assessment and Plan:   The following treatment plan was discussed    All recent labs and provider notes reviewed    Assessment & Plan  1. Annual examination.  She is due for her annual laboratory tests and blood work. A mammogram has been ordered.    2. Bunion.  She reports pain in her bunion, especially when running and wearing any type of shoes. She uses wide-shaped shoes but continues to experience discomfort. A referral for bunion removal surgery will be provided.    3. Bladder prolapse.  She has been experiencing bladder prolapse, which causes urinary incontinence even with minimal " exertion such as sneezing. She has consulted with a urologist, Dr. Goodson, who is arranging for a mesh sling procedure under her bladder. No additional referral is needed as the urologist is handling the arrangements.        1. Bunion of great toe of left foot  - Referral to Podiatry    2. Annual physical exam    3. Mixed hyperlipidemia  - CBC WITH DIFFERENTIAL; Future  - Comp Metabolic Panel; Future  - Lipid Profile; Future  - TSH+FREE T4  - T3 FREE; Future    4. Vitamin D deficiency  - VITAMIN D,25 HYDROXY (DEFICIENCY); Future    5. Encounter for screening mammogram for breast cancer  - MA-SCREENING MAMMO BILAT W/CAD; Future    Other orders  - estradiol (NEVILLE) 0.075 MG/24HR PATCH BIWEEKLY             Instructed to Follow up in clinic or ER for worsening symptoms, difficulty breathing, lack of expected recovery, or should new symptoms or problems arise.    Followup: Return in about 6 months (around 9/21/2025) for Reevaluation, labs.

## 2025-03-26 NOTE — Clinical Note
REFERRAL APPROVAL NOTICE         Sent on March 26, 2025                   Jennifer Leon  [address] [address]  [place] [address]                   Dear Ms. Leon,    After a careful review of the medical information and benefit coverage, [facility] has processed your referral. See below for additional details.    If applicable, you must be actively enrolled with your insurance for coverage of the authorized service. If you have any questions regarding your coverage, please contact your insurance directly.    REFERRAL INFORMATION   Referral #:  [id number]  Referred-To Provider    Referred-By Provider:  Podiatry    [facility]      [address] [place] [address]  [phone] [address] [place] [address]   [id number]  [place] [address]  [phone]    Referral Start Date:  03/21/2025  Referral End Date:   03/21/2026             SCHEDULING  If you do not already have an appointment, please call [phone] to make an appointment.     MORE INFORMATION  If you do not already have a [employer] account, sign up at: [email or web address]  You can access your medical information, make appointments, see lab results, billing information, and more.  If you have questions regarding this referral, please contact  the [facility] Referrals department at:             [phone]. Monday - Friday 8:00AM - 5:00PM.     Sincerely,    [facility]

## 2025-04-03 DIAGNOSIS — M21.611 BUNION OF RIGHT FOOT: ICD-10-CM

## 2025-04-03 NOTE — Clinical Note
REFERRAL APPROVAL NOTICE         Sent on April 3, 2025                   Jennifer Leon  [address] [address]  [place] [address]                   Dear Ms. Leon,    After a careful review of the medical information and benefit coverage, [facility] has processed your referral. See below for additional details.    If applicable, you must be actively enrolled with your insurance for coverage of the authorized service. If you have any questions regarding your coverage, please contact your insurance directly.    REFERRAL INFORMATION   Referral #:  [id number]  Referred-To Provider    Referred-By Provider:  Podiatry    [facility]AYLAN [facility]      [address] [place] [address]  [phone] [address]  [facility] [address] [phone]    Referral Start Date:  04/03/2025  Referral End Date:   04/03/2026             SCHEDULING  If you do not already have an appointment, please call [phone] to make an appointment.     MORE INFORMATION  If you do not already have a [employer] account, sign up at: [email or web address]  You can access your medical information, make appointments, see lab results, billing information, and more.  If you have questions regarding this referral, please contact  the [facility] Referrals department at:             [phone]. Monday - Friday 8:00AM - 5:00PM.     Sincerely,    [facility]

## 2025-08-15 ENCOUNTER — OFFICE VISIT (OUTPATIENT)
Dept: MEDICAL GROUP | Age: 50
End: 2025-08-15
Payer: COMMERCIAL

## 2025-08-15 VITALS
WEIGHT: 133 LBS | SYSTOLIC BLOOD PRESSURE: 118 MMHG | TEMPERATURE: 98.6 F | OXYGEN SATURATION: 99 % | HEART RATE: 62 BPM | BODY MASS INDEX: 23.57 KG/M2 | RESPIRATION RATE: 16 BRPM | DIASTOLIC BLOOD PRESSURE: 80 MMHG | HEIGHT: 63 IN

## 2025-08-15 DIAGNOSIS — F41.9 ANXIETY: Primary | ICD-10-CM

## 2025-08-15 PROCEDURE — 99214 OFFICE O/P EST MOD 30 MIN: CPT | Performed by: FAMILY MEDICINE

## 2025-08-15 PROCEDURE — 3079F DIAST BP 80-89 MM HG: CPT | Performed by: FAMILY MEDICINE

## 2025-08-15 PROCEDURE — 3074F SYST BP LT 130 MM HG: CPT | Performed by: FAMILY MEDICINE
